# Patient Record
Sex: FEMALE | Race: WHITE | NOT HISPANIC OR LATINO | Employment: UNEMPLOYED | URBAN - METROPOLITAN AREA
[De-identification: names, ages, dates, MRNs, and addresses within clinical notes are randomized per-mention and may not be internally consistent; named-entity substitution may affect disease eponyms.]

---

## 2022-06-03 ENCOUNTER — TELEPHONE (OUTPATIENT)
Dept: SCHEDULING | Facility: IMAGING CENTER | Age: 52
End: 2022-06-03

## 2022-06-28 ENCOUNTER — OFFICE VISIT (OUTPATIENT)
Dept: MEDICAL GROUP | Facility: MEDICAL CENTER | Age: 52
End: 2022-06-28
Attending: FAMILY MEDICINE
Payer: MEDICAID

## 2022-06-28 VITALS
HEART RATE: 72 BPM | RESPIRATION RATE: 16 BRPM | SYSTOLIC BLOOD PRESSURE: 138 MMHG | OXYGEN SATURATION: 96 % | DIASTOLIC BLOOD PRESSURE: 76 MMHG | HEIGHT: 68 IN | BODY MASS INDEX: 21.2 KG/M2 | WEIGHT: 139.9 LBS | TEMPERATURE: 97.7 F

## 2022-06-28 DIAGNOSIS — Z74.8 ASSISTANCE NEEDED WITH TRANSPORTATION: ICD-10-CM

## 2022-06-28 DIAGNOSIS — M25.561 CHRONIC PAIN OF RIGHT KNEE: ICD-10-CM

## 2022-06-28 DIAGNOSIS — Z12.11 SCREENING FOR COLON CANCER: ICD-10-CM

## 2022-06-28 DIAGNOSIS — G89.29 CHRONIC PAIN OF RIGHT KNEE: ICD-10-CM

## 2022-06-28 DIAGNOSIS — N63.0 BREAST LUMP: ICD-10-CM

## 2022-06-28 DIAGNOSIS — Z12.31 ENCOUNTER FOR SCREENING MAMMOGRAM FOR MALIGNANT NEOPLASM OF BREAST: ICD-10-CM

## 2022-06-28 PROCEDURE — 99203 OFFICE O/P NEW LOW 30 MIN: CPT | Performed by: FAMILY MEDICINE

## 2022-06-28 PROCEDURE — 99204 OFFICE O/P NEW MOD 45 MIN: CPT | Performed by: FAMILY MEDICINE

## 2022-06-28 ASSESSMENT — PATIENT HEALTH QUESTIONNAIRE - PHQ9: CLINICAL INTERPRETATION OF PHQ2 SCORE: 0

## 2022-06-28 NOTE — ASSESSMENT & PLAN NOTE
Pt noted on self exam 5 months and she thinks that one of them is growing. Nontender. No nipple discharged. No dimpling or changes in the skin. She would like to have a mammogram. Last one was done 2015 and was normal at the time.

## 2022-06-28 NOTE — PROGRESS NOTES
Subjective:     CC:    Chief Complaint   Patient presents with   • Establish Care       HISTORY OF THE PRESENT ILLNESS: Patient is a 52 y.o. female. This pleasant patient is here today to establish care and discuss the following issues.   His/her prior PCP was 5 years ago.    Specialists - none    Breast lump  Pt noted on self exam 5 months and she thinks that one of them is growing. Nontender. No nipple discharged. No dimpling or changes in the skin. She would like to have a mammogram. Last one was done  and was normal at the time.    Knee pain, right  History of meniscectomy x 2, she reports recent increase in pain.       Healthcare Maintenance  Last Pap smear - 3 years ago - [facility]. Pt think it was normal  Last mammo -  - normal  Colon cancer screening - never       Allergies: Patient has no allergy information on record.    No current [employer]-ordered outpatient medications on file.     No current [employer]-ordered facility-administered medications on file.       History reviewed. No pertinent past medical history.    Past Surgical History:   Procedure Laterality Date   • OTHER ORTHOPEDIC SURGERY Right     meniscectomy  and        Social History     Tobacco Use   • Smoking status: Former Smoker     Packs/day: 0.50     Years: 16.00     Pack years: 8.00     Types: Cigarettes     Quit date:      Years since quittin.4   • Smokeless tobacco: Never Used   Substance Use Topics   • Alcohol use: Yes     Comment: Pt . states 1-2 drinks apx 3 times a week. CS   • Drug use: Yes     Frequency: 14.0 times per week     Types: Marijuana, Inhaled     Comment: Pt. states she smokes recreationally        Social History     Social History Narrative   • Not on file       Family History   Problem Relation Age of Onset   • Heart Disease Father         smoker   • Cancer Paternal Cousin         breast   • Diabetes Neg Hx    • Stroke Neg Hx              Objective:     Exam:  "/76 (BP Location: Right arm, Patient Position: Sitting, BP Cuff Size: Adult)   Pulse 72   Temp 36.5 °C (97.7 °F) (Temporal)   Resp 16   Ht 1.727 m (5' 8\")   Wt 63.5 kg (139 lb 14.4 oz)   SpO2 96%  Body mass index is 21.27 kg/m².    General: Normal appearing. No distress.  Head: normocephalic  Pulmonary: Clear to ausculation.  Normal effort. No rales, ronchi, or wheezing.  Cardiovascular: Regular rate and rhythm without murmur.   Breast:  Bilaterally symmetrical, no nipple discharge, no skin changes or dimpling are  noted.  No lymphadenopathy. Breast exam is below with nodules on the R breast as noted  Psych: Normal mood and affect. Alert and oriented x3. Judgment and insight is normal.    R breast  [id number] nodule - 12 oclock, 6cm from nipple  [id number] nodule 12 oclock, 2.5 cm from nipple  1cm x 3cm nodule - 10 oclock, oriented nipple > axilla. Distal side of the nodule is 3cm from nipple     Physical Exam  Chest:               Labs:   Reviewed last mammo 2015    Assessment & Plan:   52 y.o. female with the following -    1. Breast lump  - MA-DIAGNOSTIC MAMMO RIGHT W/TOMOSYNTHESIS W/CAD; Future  Mammo ordered for pt. She is quite concerned about this and declines other health maintenance items until this is taken care of.     2. Encounter for screening mammogram for malignant neoplasm of breast  - MA-SCREENING MAMMO LEFT W/TOMOSYNTHESIS W/CAD; Future  Screening mammo of left breast    3. Screening for colon cancer  - OCCULT BLOOD FECES IMMUNOASSAY; Future    4. Chronic pain of right knee  Pt will hold off on this for now until breast imaging is done    5. Assistance needed with transportation  - REFERRAL TO CARE MANAGEMENT      Return if symptoms worsen or fail to improve.   Pt declines all other vaccinations and healthcare maintenance items until breast issue is resolved.   Pap smear is reportedly up to date, records are requested.    Please note that this dictation was created using voice recognition " software. I have made every reasonable attempt to correct obvious errors, but I expect that there are errors of grammar and possibly content that I did not discover before finalizing the note.

## 2022-06-28 NOTE — LETTER
[facility]  Dot Smith M.D.  [address]  [place] [address]  Fax: [phone]   Authorization for Release/Disclosure of   Protected Health Information   Name: WON WEBER : 1970 SSN: [id number]   Address: [address] [address] Phone:    [phone] (home)    I authorize the entity listed below to release/disclose the PHI below to:   [facility]/Dot Smith M.D. and Dot Smith M.D.   [facility]  [facility]   Address   City, State, [facility]   Phone:      Fax:     Reason for request: continuity of care   Information to be released: around 2018   [  ] LAST COLONOSCOPY,  including any PATH REPORT and follow-up  [  ] LAST FIT/COLOGUARD RESULT [  ] LAST DEXA  [  ] LAST MAMMOGRAM  [ X ] LAST PAP  [  ] LAST LABS [  ] RETINA EXAM REPORT  [  ] IMMUNIZATION RECORDS  [  ] Release all info      [  ] Check here and initial the line next to each item to release ALL health information INCLUDING  _____ Care and treatment for drug and / or alcohol abuse  _____ HIV testing, infection status, or AIDS  _____ Genetic Testing    DATES OF SERVICE OR TIME PERIOD TO BE DISCLOSED: _____________  I understand and acknowledge that:  * This Authorization may be revoked at any time by you in writing, except if your health information has already been used or disclosed.  * Your health information that will be used or disclosed as a result of you signing this authorization could be re-disclosed by the recipient. If this occurs, your re-disclosed health information may no longer be protected by State or Federal laws.  * You may refuse to sign this Authorization. Your refusal will not affect your ability to obtain treatment.  * This Authorization becomes effective upon signing and will  on (date) __________.      If no date is indicated, this Authorization will  one (1) year from the signature date.    Name: Won Weber    Signature:   Date:     2022        PLEASE FAX REQUESTED RECORDS BACK TO: [phone]

## 2022-06-29 ENCOUNTER — PATIENT OUTREACH (OUTPATIENT)
Dept: HEALTH INFORMATION MANAGEMENT | Facility: OTHER | Age: 52
End: 2022-06-29
Payer: MEDICAID

## 2022-06-29 NOTE — PROGRESS NOTES
6/29/22  CHW Anamika received referral from patient's PCP to help with transportation. Patient lives in [place] and has MTM benefits. CHW called patient to complete intake and left a voice message with contact information for [facility].    7/1/22  ROBERTAW Anamika called patient and left a voice message with contact info for C[facility].    7/7/22  CHW Anamika called patient and left a voice message with contact info for CCM. CHW will send letter to patient's address with contact info for CCM. CHW will not follow as patient is unable to be contacted.

## 2022-08-08 ENCOUNTER — HOSPITAL ENCOUNTER (OUTPATIENT)
Dept: RADIOLOGY | Facility: MEDICAL CENTER | Age: 52
End: 2022-08-08
Attending: FAMILY MEDICINE
Payer: MEDICAID

## 2022-08-08 DIAGNOSIS — N63.0 BREAST LUMP: ICD-10-CM

## 2022-08-08 PROCEDURE — 76642 ULTRASOUND BREAST LIMITED: CPT | Mod: RT

## 2022-08-08 PROCEDURE — G0279 TOMOSYNTHESIS, MAMMO: HCPCS

## 2022-08-10 ENCOUNTER — TELEPHONE (OUTPATIENT)
Dept: MEDICAL GROUP | Facility: MEDICAL CENTER | Age: 52
End: 2022-08-10
Payer: MEDICAID

## 2022-08-10 NOTE — TELEPHONE ENCOUNTER
Phone Number Called: [phone] (home)       Call outcome: Spoke to patient regarding message below.    Message: pt inform of result notes, patient mention since the lump does not show to be cancerous she is wondering what can it be?  ----- Message from Dot Smith M.D. sent at 8/9/2022 10:26 PM PDT -----  Please let pt know that the breast lumps did not appear cancerous on her mammogram. Follow up for routine mammographic screening in 1 year.

## 2024-08-13 ENCOUNTER — OFFICE VISIT (OUTPATIENT)
Dept: MEDICAL GROUP | Facility: MEDICAL CENTER | Age: 54
End: 2024-08-13
Attending: FAMILY MEDICINE
Payer: MEDICAID

## 2024-08-13 VITALS
DIASTOLIC BLOOD PRESSURE: 60 MMHG | OXYGEN SATURATION: 98 % | RESPIRATION RATE: 14 BRPM | SYSTOLIC BLOOD PRESSURE: 98 MMHG | HEIGHT: 68 IN | BODY MASS INDEX: 21.52 KG/M2 | TEMPERATURE: 97.4 F | HEART RATE: 60 BPM | WEIGHT: 142 LBS

## 2024-08-13 DIAGNOSIS — M25.561 CHRONIC PAIN OF RIGHT KNEE: ICD-10-CM

## 2024-08-13 DIAGNOSIS — G89.29 CHRONIC PAIN OF RIGHT KNEE: ICD-10-CM

## 2024-08-13 PROCEDURE — 3074F SYST BP LT 130 MM HG: CPT | Performed by: FAMILY MEDICINE

## 2024-08-13 PROCEDURE — 99213 OFFICE O/P EST LOW 20 MIN: CPT | Performed by: FAMILY MEDICINE

## 2024-08-13 PROCEDURE — 3078F DIAST BP <80 MM HG: CPT | Performed by: FAMILY MEDICINE

## 2024-08-13 ASSESSMENT — PATIENT HEALTH QUESTIONNAIRE - PHQ9: CLINICAL INTERPRETATION OF PHQ2 SCORE: 0

## 2024-08-13 NOTE — ASSESSMENT & PLAN NOTE
Pt has history of 2 meniscus surgeries (last was maybe 9 years ago) in the past but pain has been worse for 2 years now  Pain is on the medial right knee and feels that the knee cap is pulling laterally   The knee has given out on her when pivoting or moving side to side. She has not fallen but she has had to catch herself. No locking   She takes ibuprofen when it flares up, about 200-800mg once or twice a day when needed, some days doesn't need it

## 2024-08-13 NOTE — PROGRESS NOTES
"    Subjective:     CC:   Chief Complaint   Patient presents with    Knee Pain     right         HPI:     Knee pain, right  Pt has history of 2 meniscus surgeries (last was maybe 9 years ago) in the past but pain has been worse for 2 years now  Pain is on the medial right knee and feels that the knee cap is pulling laterally   The knee has given out on her when pivoting or moving side to side. She has not fallen but she has had to catch herself. No locking   She takes ibuprofen when it flares up, about 200-800mg once or twice a day when needed, some days doesn't need it     No past medical history on file.    Social History     Tobacco Use    Smoking status: Former     Current packs/day: 0.00     Average packs/day: 0.5 packs/day for 16.0 years (8.0 ttl pk-yrs)     Types: Cigarettes     Start date:      Quit date:      Years since quittin.6    Smokeless tobacco: Never   Substance Use Topics    Alcohol use: Yes     Comment: Pt . states 1-2 drinks apx 3 times a week.     Drug use: Yes     Frequency: 14.0 times per week     Types: Marijuana, Inhaled     Comment: Pt. states she smokes recreationally        No current Epic-ordered outpatient medications on file.     No current Epic-ordered facility-administered medications on file.       Allergies:  Patient has no known allergies.        Objective:       Exam:  BP 98/60 (BP Location: Left arm, Patient Position: Sitting)   Pulse 60   Temp 36.3 °C (97.4 °F) (Temporal)   Resp 14   Ht 1.727 m (5' 8\")   Wt 64.4 kg (142 lb)   SpO2 98%   BMI 21.59 kg/m²  Body mass index is 21.59 kg/m².    General: Normal appearing. No distress.  Musculoskeletal -     Knee exam:  At the right knee    Inspection: Normal muscle bulk and tone. No atrophy of quads. No deformity    ROM: There is full ROM with flexion and extension. Mild valgus instability compared to left leg    Palpation:  MCL: TTP  LCL: no ttp  Medial joint line: TTP  Lateral joint line: " no ttp  Pes anserine insertion: TTP  Lateral femoral condyle at the ITB: no ttp  Gerdy tubercle at ITB insertion: no ttp  Undersurface medial patellar facet : TTP  Undersurface lateral patellar facet: no ttp  Quadriceps tendons: no ttp  Patellar tendon: no ttp  Hamstring tendons: no ttp    Special tests: Lachman neg, Julia neg - patellar instability with julia. Patellar grind: pos. Thessaly: could not be done    Functional single leg squat performance: could not be done      Data reviewed:   None new    Assessment & Plan:     54 y.o. female with the following -     1. Chronic pain of right knee  - DX-KNEE COMPLETE 4+ RIGHT; Future  Pt is hopeful for PT. For now I have recommended to take 400mg ibuprofen + tylenol two to three times per day as needed for pain. Can also use voltaren gel. I am concerned about 2 prior mensicus surgeries as well as the unstable patella and will complete xrays first before deciding on PT vs going directly to orthopedic surgery for evaluation. Xrays ordered. Pt to complete and return to discuss in 1 month    Pt has multiple HCM items that we discussed today but she declines, would like to complete knee treatment first.    Return in about 1 month (around 9/13/2024).    Please note that this dictation was created using voice recognition software. I have made every reasonable attempt to correct obvious errors, but I expect that there are errors of grammar and possibly content that I did not discover before finalizing the note.

## 2024-08-15 ENCOUNTER — APPOINTMENT (OUTPATIENT)
Dept: RADIOLOGY | Facility: MEDICAL CENTER | Age: 54
End: 2024-08-15
Attending: FAMILY MEDICINE
Payer: MEDICAID

## 2024-08-19 ENCOUNTER — APPOINTMENT (OUTPATIENT)
Dept: RADIOLOGY | Facility: MEDICAL CENTER | Age: 54
End: 2024-08-19
Attending: FAMILY MEDICINE
Payer: MEDICAID

## 2024-08-19 DIAGNOSIS — M25.561 CHRONIC PAIN OF RIGHT KNEE: ICD-10-CM

## 2024-08-19 DIAGNOSIS — G89.29 CHRONIC PAIN OF RIGHT KNEE: ICD-10-CM

## 2024-08-19 PROCEDURE — 73564 X-RAY EXAM KNEE 4 OR MORE: CPT | Mod: RT

## 2024-08-22 DIAGNOSIS — M25.561 CHRONIC PAIN OF RIGHT KNEE: ICD-10-CM

## 2024-08-22 DIAGNOSIS — G89.29 CHRONIC PAIN OF RIGHT KNEE: ICD-10-CM

## 2024-08-22 DIAGNOSIS — M17.11 ARTHRITIS OF RIGHT KNEE: ICD-10-CM
